# Patient Record
Sex: MALE | Race: ASIAN
[De-identification: names, ages, dates, MRNs, and addresses within clinical notes are randomized per-mention and may not be internally consistent; named-entity substitution may affect disease eponyms.]

---

## 2020-01-28 ENCOUNTER — HOSPITAL ENCOUNTER (EMERGENCY)
Dept: HOSPITAL 26 - MED | Age: 30
Discharge: HOME | End: 2020-01-28
Payer: SELF-PAY

## 2020-01-28 VITALS — HEIGHT: 71 IN | WEIGHT: 274 LBS | BODY MASS INDEX: 38.36 KG/M2

## 2020-01-28 VITALS — DIASTOLIC BLOOD PRESSURE: 99 MMHG | SYSTOLIC BLOOD PRESSURE: 139 MMHG

## 2020-01-28 VITALS — SYSTOLIC BLOOD PRESSURE: 139 MMHG | DIASTOLIC BLOOD PRESSURE: 99 MMHG

## 2020-01-28 DIAGNOSIS — J06.9: Primary | ICD-10-CM

## 2020-01-28 DIAGNOSIS — F17.210: ICD-10-CM

## 2020-01-28 NOTE — NUR
C/O COUGH, FLU LIKE SX X SAT. LUNG OSUDS CLEAR ALL THORUGHOUT. PRODUCTIVE COUGH 
PRESENT. A & O X4. NO RESP DISTRESS NOTED. 100% RA. STEADY GAIT. RUNNY NOSE 
PRESENT. PT IS TAKING NYQUIIL AND TYLENOL AT HOME. NO SOB.



NKA.



NO MPH.

## 2020-01-28 NOTE — NUR
Patient discharged with v/s stable. Written and verbal after care instructions 
given and explained. 

Patient alert, oriented and verbalized understanding of instructions. 
Ambulatory with steady gait. All questions addressed prior to discharge. ID 
band removed. Patient advised to follow up with PMD. Rx of IBUPROFEN, 
ACETAMINOPHEN, AND PROMETHAZINE given. Patient educated on indication of 
medication including possible reaction and side effects. Opportunity to ask 
questions provided and answered.